# Patient Record
Sex: FEMALE | Employment: UNEMPLOYED | URBAN - METROPOLITAN AREA
[De-identification: names, ages, dates, MRNs, and addresses within clinical notes are randomized per-mention and may not be internally consistent; named-entity substitution may affect disease eponyms.]

---

## 2017-03-18 ENCOUNTER — HOSPITAL ENCOUNTER (OUTPATIENT)
Age: 57
Discharge: HOME | End: 2017-03-18
Attending: EMERGENCY MEDICINE
Payer: COMMERCIAL

## 2017-03-18 VITALS
DIASTOLIC BLOOD PRESSURE: 93 MMHG | HEART RATE: 93 BPM | RESPIRATION RATE: 16 BRPM | BODY MASS INDEX: 23 KG/M2 | SYSTOLIC BLOOD PRESSURE: 126 MMHG | TEMPERATURE: 99 F | OXYGEN SATURATION: 98 % | WEIGHT: 124 LBS

## 2017-03-18 DIAGNOSIS — R10.13 EPIGASTRIC PAIN: Primary | ICD-10-CM

## 2017-03-18 DIAGNOSIS — R11.2 NAUSEA AND VOMITING IN ADULT: ICD-10-CM

## 2017-03-18 PROCEDURE — 99204 OFFICE O/P NEW MOD 45 MIN: CPT

## 2017-03-18 PROCEDURE — 99213 OFFICE O/P EST LOW 20 MIN: CPT

## 2017-03-18 RX ORDER — ONDANSETRON 4 MG/1
8 TABLET, ORALLY DISINTEGRATING ORAL ONCE
Status: COMPLETED | OUTPATIENT
Start: 2017-03-18 | End: 2017-03-18

## 2017-03-18 RX ORDER — RANITIDINE 150 MG/1
150 TABLET ORAL 2 TIMES DAILY
COMMUNITY

## 2017-03-18 RX ORDER — GARLIC EXTRACT 500 MG
1 CAPSULE ORAL DAILY
COMMUNITY

## 2017-03-18 NOTE — ED NOTES
No change in patient assessment. States nausea has decreased. Pt prefers to go home and not the ER. Will follow up as needed.

## 2017-03-18 NOTE — ED INITIAL ASSESSMENT (HPI)
Onset Monday of abd cramping. Loose stool and diarrhea Tuesday. No bowel movement since. Nausea and vomiting since Tuesday. States comes in waves. Able to have some sips of ensure and sips of water.  States everytime she tries to drink something she gets ab

## 2017-03-18 NOTE — ED PROVIDER NOTES
Patient presents with:  Abdominal Pain    HPI:     Oumar Roque is a 59year old female who presents with chief complaint of diarrhea, n/v and epigastric pain. Started with loose stool 6 days ago. The next day began with n/v and epigastric pain.   Stat very hesitant to go to the ED. I explained that those are my recommendations as I cannot fully evaluate here due to limitations. Discussed the course of choledocholithiasis, pancreatitis with pt in detail.   Pt voiced understanding of significant worsenin

## 2018-09-14 ENCOUNTER — OFFICE VISIT (OUTPATIENT)
Dept: FAMILY MEDICINE CLINIC | Facility: CLINIC | Age: 58
End: 2018-09-14
Payer: COMMERCIAL

## 2018-09-14 VITALS
HEART RATE: 83 BPM | DIASTOLIC BLOOD PRESSURE: 82 MMHG | SYSTOLIC BLOOD PRESSURE: 110 MMHG | BODY MASS INDEX: 22 KG/M2 | RESPIRATION RATE: 16 BRPM | OXYGEN SATURATION: 97 % | WEIGHT: 122.38 LBS | TEMPERATURE: 99 F

## 2018-09-14 DIAGNOSIS — J02.9 SORE THROAT: Primary | ICD-10-CM

## 2018-09-14 DIAGNOSIS — R05.9 COUGH: ICD-10-CM

## 2018-09-14 DIAGNOSIS — R09.81 SINUS CONGESTION: ICD-10-CM

## 2018-09-14 LAB
CONTROL LINE PRESENT WITH A CLEAR BACKGROUND (YES/NO): YES YES/NO
STREP GRP A CUL-SCR: NEGATIVE

## 2018-09-14 PROCEDURE — 99203 OFFICE O/P NEW LOW 30 MIN: CPT | Performed by: FAMILY MEDICINE

## 2018-09-14 PROCEDURE — 87880 STREP A ASSAY W/OPTIC: CPT | Performed by: FAMILY MEDICINE

## 2018-09-14 RX ORDER — AMOXICILLIN AND CLAVULANATE POTASSIUM 875; 125 MG/1; MG/1
1 TABLET, FILM COATED ORAL 2 TIMES DAILY
Qty: 20 TABLET | Refills: 0 | Status: SHIPPED | OUTPATIENT
Start: 2018-09-14 | End: 2018-09-24

## 2018-09-14 NOTE — PROGRESS NOTES
HPI:    Patient ID: Gwyn Desouza is a 59year old female. Patient presents with:  Cough  Sore Throat      HPI  Patient is here for cough and nasal congestion for 14 days. States cough is productive of yellowish sputum. Denies fever and chills.  Has bi Pruritus 2011   • Routine general medical examination at a health care facility 9/12/2011   • Trigger thumb 5/31/2012      Past Surgical History:  9/29/2011: HEMORRHOIDECTOMY      Comment:  excision Lt lateral mixed hemorrhoid x1 column &                ca

## 2018-09-16 ENCOUNTER — TELEPHONE (OUTPATIENT)
Dept: FAMILY MEDICINE CLINIC | Facility: CLINIC | Age: 58
End: 2018-09-16

## 2018-09-16 NOTE — TELEPHONE ENCOUNTER
C/o s/e with Augmentin, reports profuse non-bloody, watery diarrhea. Denies fever, no chills/sweats. Eating yogurt. Prior h/o diarrhea with abx . Advised to d/c Augmentin, start oral probiotic, avoid dairy. Call with update in 1-2 days.    Ezra Betts Bes

## 2018-09-19 ENCOUNTER — TELEPHONE (OUTPATIENT)
Dept: FAMILY MEDICINE CLINIC | Facility: CLINIC | Age: 58
End: 2018-09-19

## 2018-09-19 DIAGNOSIS — J01.90 ACUTE SINUSITIS, RECURRENCE NOT SPECIFIED, UNSPECIFIED LOCATION: Primary | ICD-10-CM

## 2018-09-19 RX ORDER — DOXYCYCLINE HYCLATE 100 MG
100 TABLET ORAL 2 TIMES DAILY
Qty: 20 TABLET | Refills: 0 | Status: SHIPPED | OUTPATIENT
Start: 2018-09-19 | End: 2018-09-29

## 2018-09-19 NOTE — TELEPHONE ENCOUNTER
Pt. Calling stating that diarrhea is now gone, but still having sinus symptoms. Would like additional Rx for sinus infection since had a reaction to Augmentin. Will send over Doxy BID x10 days. Instructed pt. To cont. flonase as well as probiotic.  Pt. Ve

## 2020-06-29 PROBLEM — M25.461 EFFUSION OF KNEE JOINT RIGHT: Status: ACTIVE | Noted: 2020-06-29

## 2020-06-29 PROBLEM — M23.91 INTERNAL DERANGEMENT OF KNEE, RIGHT: Status: ACTIVE | Noted: 2020-06-29

## 2021-02-01 PROBLEM — M70.50 SEMIMEMBRANOSUS BURSITIS: Status: ACTIVE | Noted: 2021-02-01

## 2025-05-27 NOTE — ED QUICK NOTES
Pt did not tolerate ankle stirrup. States too painful. Ace wrap applied. Pt did not tolerate ace wrap due to pain. Ace wraps removed. Pt states she cannot tolerate the ice pack on foot or ankle due to pain so ice pack placed behind knee

## 2025-05-27 NOTE — ED INITIAL ASSESSMENT (HPI)
Pt here w/ right ankle and foot pain after missing a step last night. Swelling and bruising to lateral malleolus and foot

## 2025-05-27 NOTE — DISCHARGE INSTRUCTIONS
Splint as directed.  Use crutches as needed for ambulation.  Ice and elevate the affected area.  Tylenol or ibuprofen as needed for pain.  Activity as tolerated.    Follow-up with Ortho.  See your discharge paperwork for referral information.  If there are any new, changing or worsening symptoms return for reevaluation or go to the ER.

## 2025-05-27 NOTE — ED PROVIDER NOTES
Patient Seen in: [facility] In [place]        History  Chief Complaint   Patient presents with    Leg or Foot Injury     Stated Complaint: Injured rt ankle yesterday. Swollen today and in a lot of pain.    Subjective:   HPI            CHIEF COMPLAINT: Right ankle injury     HISTORY OF PRESENT ILLNESS: Patient is a 65-year-old female presenting for evaluation of a right ankle injury.  She states she was walking outside of the patio door yesterday.  There was 1 step down and she misjudged how far the step was.  She ended up rolling her right ankle.  She has had pain and swelling since.  States she has been using crutches for ambulation.  Took a Vicodin this morning without relief.  No head injury.     REVIEW OF SYSTEMS:    Musculoskeletal: As above  Skin: no rashes  Neurological: No numbness     Otherwise a complete review of systems was obtained and other than the HPI was negative     The patient's medication list, past medical history and social history elements is as listed in today's nurse's notes are reviewed and agree. The patient's family history is reviewed and is noncontributory to the presenting problem, except as indicated as above.      Objective:     Past Medical History:    Bilateral carpal tunnel syndrome    Counseling for marital and partner problems, unspecified    Hemorrhoids    mixed prolapsing hemorrhoids x1 column Lt lateral position w/ Rt anterior internal hemorrhoid    Nonspecific abnormal electrocardiogram (ECG) (EKG)    Pruritus    Routine general medical examination at a health care facility    Trigger thumb              Past Surgical History:   Procedure Laterality Date    Hemorrhoidectomy  9/29/2011    excision Lt lateral mixed hemorrhoid x1 column & cauterization of internal hemorrhoid @ Rt anterior position                Social History     Socioeconomic History    Marital status:    Tobacco Use    Smoking status: Smoker, Current Status Unknown    Smokeless  tobacco: Never   Substance and Sexual Activity    Alcohol use: No    Drug use: No     Social Drivers of Health      Received from [facility]    Housing Stability                                Physical Exam    ED Triage Vitals [05/27/25 0910]   /65   Pulse 78   Resp 18   Temp 97.8 °F (36.6 °C)   Temp src    SpO2 99 %   O2 Device None (Room air)       Current Vitals:   Vital Signs  BP: 145/65  Pulse: 78  Resp: 18  Temp: 97.8 °F (36.6 °C)    Oxygen Therapy  SpO2: 99 %  O2 Device: None (Room air)            Physical Exam  Vital signs and nursing notes reviewed  General Appearance: No acute distress  Neurological:  A&Ox3  Psychiatric: calm and cooperative  Respiratory: Normal effort  Skin:  warm and dry, no rashes.   Right ankle: No deformity noted.  Mild tenderness to palpation just distal and anterior to the lateral malleolus.  Some mild edema in this area.  No ecchymosis.  Passive and active range of motion limited due to pain.  5 out of 5 dorsiflexion and 5 out of 5 plantarflexion.  2+ DPs.  Normal sensation of the distal digits.  No tenderness to palpation of the proximal tib-fib.  No tenderness to palpation of the fifth metatarsal        ED Course  Labs Reviewed - No data to display  Differential diagnosis is ankle fracture, ankle sprain, foot fracture, foot sprain     XR ANKLE (MIN 3 VIEWS), RIGHT (CPT=73610)  Result Date: 5/27/2025  PROCEDURE:  XR ANKLE (MIN 3 VIEWS), RIGHT (CPT=73610)  TECHNIQUE:  Three views were obtained.  COMPARISON:  [facility], XR, XR FOOT, COMPLETE (MIN 3 VIEWS), RIGHT (CPT=73630), 5/27/2025, 9:16 AM.  INDICATIONS:  Injured rt ankle yesterday. Swollen today and in a lot of pain.  Ankle pain  PATIENT STATED HISTORY: (As transcribed by Technologist)  Patient complains of pain around the entire right ankle after falling when missing 1 step 1 day ago. Patient denies surgery.    FINDINGS:  No evidence of acute displaced fracture or dislocation.  Mild circumferential  soft tissue swelling, most pronounced anterolaterally.  Old avulsion injury or accessory ossicle along the tip of the lateral malleolus.  Ankle mortise intact.  Osteopenia.            CONCLUSION:  No evidence of acute displaced fracture or dislocation in the right ankle.  Soft tissue swelling  LOCATION:  [id number]   Dictated by (CST): Brad Sterling MD on 5/27/2025 at 9:44 AM     Finalized by (CST): Brad Sterling MD on 5/27/2025 at 9:44 AM       XR FOOT, COMPLETE (MIN 3 VIEWS), RIGHT (CPT=73630)  Result Date: 5/27/2025  PROCEDURE:  XR FOOT, COMPLETE (MIN 3 VIEWS), RIGHT (CPT=73630)  TECHNIQUE:  AP, oblique, and lateral views were obtained.  COMPARISON:  None.  INDICATIONS:  Injured rt ankle yesterday. Swollen today and in a lot of pain.  Foot pain  PATIENT STATED HISTORY: (As transcribed by Technologist)  Patient complains of pain around the lateral and dorsal right foot after falling when missing 1 step 1 day ago. Patient denies surgery.   FINDINGS:  No evidence of acute displaced fracture or dislocation.  Osteopenia.  Unremarkable soft tissues.            CONCLUSION:  No evidence of acute displaced fracture or dislocation in the right foot.  LOCATION:  [id number]   Dictated by (CST): Brad Sterling MD on 5/27/2025 at 9:43 AM     Finalized by (CST): Brad Sterling MD on 5/27/2025 at 9:44 AM         I personally reviewed the x-ray images of the right foot and ankle.  No fracture or dislocation appreciated.                MDM     This is a well-appearing 65-year-old female who presents for evaluation of her right foot and ankle injury she sustained yesterday.  X-rays of the right foot and ankle are negative for fracture or dislocation.  Will place patient in a stirrup splint.  Tylenol or ibuprofen as needed for pain management.  Crutches as needed for ambulation.  Patient reports having her own crutches at home.  Ice and elevate the affected area.  Activity as tolerated.    Follow-up with Ortho.  See your discharge  paperwork for referral information.  If there are any new, changing or worsening symptoms return for reevaluation or go to the ER.        Medical Decision Making  Amount and/or Complexity of Data Reviewed  Radiology: ordered and independent interpretation performed. Decision-making details documented in ED Course.    Risk  OTC drugs.        Disposition and Plan     Clinical Impression:  1. Injury of right ankle, initial encounter         Disposition:  Discharge  5/27/2025  9:49 am    Follow-up:  Gracy Hopper PA-C  [address] [place] [id number] [phone]    Call today  Ortho follow up          Medications Prescribed:  Current Discharge Medication List                Supplementary Documentation:

## (undated) NOTE — ED AVS SNAPSHOT
THE [facility] in [facility] [place] [address] [address]    Phone:  [phone]    Fax:  [phone] [facility] 0955   MRN: [id number]    Department:  THE [facility] in Bed   Date of Visit:  3/18/2017           Diag from our patient liason soon after your visit. Also, some patients receive a detailed feedback survey mailed to them a week after the visit. If you receive this, we would really appreciate it if you could take the time to complete it. Thank you!       You [facility] [address] [facility] ([address] [facility] [id number] [address] ([address]) [facility] [address] [facility]. ([address]) [place] Call Support Staff. Remember, MyChart is NOT to be used for urgent needs. For medical emergencies, dial 911.